# Patient Record
Sex: MALE | Race: OTHER | HISPANIC OR LATINO
[De-identification: names, ages, dates, MRNs, and addresses within clinical notes are randomized per-mention and may not be internally consistent; named-entity substitution may affect disease eponyms.]

---

## 2020-09-04 ENCOUNTER — APPOINTMENT (OUTPATIENT)
Dept: UROLOGY | Facility: CLINIC | Age: 85
End: 2020-09-04
Payer: MEDICARE

## 2020-09-04 ENCOUNTER — LABORATORY RESULT (OUTPATIENT)
Age: 85
End: 2020-09-04

## 2020-09-04 VITALS
BODY MASS INDEX: 25.39 KG/M2 | TEMPERATURE: 98.4 F | HEIGHT: 66 IN | OXYGEN SATURATION: 98 % | SYSTOLIC BLOOD PRESSURE: 137 MMHG | DIASTOLIC BLOOD PRESSURE: 77 MMHG | WEIGHT: 158 LBS | HEART RATE: 77 BPM

## 2020-09-04 DIAGNOSIS — Z86.711 PERSONAL HISTORY OF PULMONARY EMBOLISM: ICD-10-CM

## 2020-09-04 DIAGNOSIS — Z72.89 OTHER PROBLEMS RELATED TO LIFESTYLE: ICD-10-CM

## 2020-09-04 DIAGNOSIS — I27.82 CHRONIC PULMONARY EMBOLISM: ICD-10-CM

## 2020-09-04 DIAGNOSIS — Z86.79 PERSONAL HISTORY OF OTHER DISEASES OF THE CIRCULATORY SYSTEM: ICD-10-CM

## 2020-09-04 DIAGNOSIS — E13.59 OTHER SPECIFIED DIABETES MELLITUS WITH OTHER CIRCULATORY COMPLICATIONS: ICD-10-CM

## 2020-09-04 DIAGNOSIS — Z86.718 PERSONAL HISTORY OF OTHER VENOUS THROMBOSIS AND EMBOLISM: ICD-10-CM

## 2020-09-04 DIAGNOSIS — Z95.5 PRESENCE OF CORONARY ANGIOPLASTY IMPLANT AND GRAFT: ICD-10-CM

## 2020-09-04 PROBLEM — Z00.00 ENCOUNTER FOR PREVENTIVE HEALTH EXAMINATION: Status: ACTIVE | Noted: 2020-09-04

## 2020-09-04 PROCEDURE — 99203 OFFICE O/P NEW LOW 30 MIN: CPT

## 2020-09-04 NOTE — REVIEW OF SYSTEMS
[see HPI] : see HPI [Eyesight Problems] : eyesight problems [Recent Weight Loss (___ Lbs)] : recent [unfilled] ~Ulb weight loss [Initiating Urination Req. Strain] : initiating urination requires straining [Incomplete Emptying] : incomplete emptying of bladder [Negative] : Gastrointestinal

## 2020-09-04 NOTE — ASSESSMENT
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 Levi Orta for lower urinary tract symptoms and an elevated PSA of 11.4.  The patient is not bothered by his urinary symptoms.  He does have significant residual.  Will 1 queried about his symptoms and his willingness to take medication for them he expressed interest.  We discussed Flomax and its side effects is potential impact on his urinary symptoms.  He was instructed to take Flomax 1 tablet 1/2-hour after 1 meal daily.  He was warned regarding hypotension dizziness etc.\par \par We discussed his elevated PSA of 11.4.  He was unaware of this.  We discussed the fact that this may represent prostate carcinoma.  He does not have any physical findings suggestive of this.  We will repeat his PSA.  He will be seen in 2 weeks to review his urinary symptoms and his PSA as well as any further testing that may be warranted.\par \par The LIYA GROSSMAN  expressed fully understanding of the information provided, the consequences and the management.\par

## 2020-09-04 NOTE — LETTER BODY
[FreeTextEntry2] : Quincy Orta MD\par [address] [place] [address]\par [address]\par [place], [place] [address]\par  [FreeTextEntry1] : Dear Quincy,\par \par Thank you for your kind referral.  I am enclosing a copy of my office note for your information.\par \par I will keep you informed of any developments.\par \par Feel free to contact me if you have any questions.\par \par Sincerely,\par \par Amrit Bryant MD, FACS\par Professor of Urology\par [facility]\par \par [address] [facility]\par [place], [place]  [address]\par \par [address]\par [place], [place] [address]\par \par Office Telephone \par [phone]\par [phone]\par

## 2020-09-04 NOTE — PHYSICAL EXAM
[General Appearance - Well Nourished] : well nourished [Exaggerated Use Of Accessory Muscles For Inspiration] : no accessory muscle use [Abdomen Mass (___ Cm)] : no abdominal mass palpated [Costovertebral Angle Tenderness] : no ~M costovertebral angle tenderness [Prostate Enlargement] : the prostate was not enlarged [Urethral Meatus] : meatus normal [Penis Abnormality] : normal uncircumcised penis [Prostate Tenderness] : the prostate was not tender [Skin Color & Pigmentation] : normal skin color and pigmentation [Affect] : the affect was normal [Edema] : no peripheral edema [No Focal Deficits] : no focal deficits [FreeTextEntry1] : blind

## 2020-09-05 LAB
APPEARANCE: ABNORMAL
BILIRUBIN URINE: NEGATIVE
BLOOD URINE: NEGATIVE
COLOR: YELLOW
GLUCOSE QUALITATIVE U: NEGATIVE
KETONES URINE: NORMAL
LEUKOCYTE ESTERASE URINE: ABNORMAL
NITRITE URINE: NEGATIVE
PH URINE: 5.5
PROTEIN URINE: ABNORMAL
PSA SERPL-MCNC: 8.85 NG/ML
SPECIFIC GRAVITY URINE: 1.03
UROBILINOGEN URINE: ABNORMAL

## 2020-09-08 LAB
ALP BONE SERPL-MCNC: 6 MCG/L
BACTERIA UR CULT: NORMAL

## 2020-09-18 ENCOUNTER — APPOINTMENT (OUTPATIENT)
Dept: UROLOGY | Facility: CLINIC | Age: 85
End: 2020-09-18
Payer: MEDICARE

## 2020-09-18 PROCEDURE — 51798 US URINE CAPACITY MEASURE: CPT

## 2020-09-18 PROCEDURE — 99213 OFFICE O/P EST LOW 20 MIN: CPT | Mod: 25

## 2020-09-18 NOTE — ASSESSMENT
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 Levi Orta for lower urinary tract symptoms and an elevated PSA of 11.4.  The patient is not bothered by his urinary symptoms.  He does have significant residual.  Will 1 queried about his symptoms and his willingness to take medication for them he expressed interest.  We discussed Flomax and its side effects is potential impact on his urinary symptoms.  He was instructed to take Flomax 1 tablet 1/2-hour after 1 meal daily.  He was warned regarding hypotension dizziness etc.\par \par We discussed his elevated PSA of 11.4.  He was unaware of this.  We discussed the fact that this may represent prostate carcinoma.  He does not have any physical findings suggestive of this.  We will repeat his PSA.  He will be seen in 2 weeks to review his urinary symptoms and his PSA as well as any further testing that may be warranted.\par \par The LIYA GROSSMAN  expressed fully understanding of the information provided, the consequences and the management.\par \par 9.18.2020\par urinary symptoms improved on flomax\par PVR reduced \par flow nondiagnostic\par \par discussed elevated PSA\par discussed possible biopsy \par discussed risks \par discussed deferring biopsy in light of age \par will monitor PSA\par proceed with CT of pelvis re evidence of disease\par \par

## 2020-09-18 NOTE — LETTER BODY
[FreeTextEntry2] : Quincy Orta MD\par [address] [place] [address]\par [address]\par [place], [place] [address]\par  [FreeTextEntry1] : Dear Quincy\par \par \par I had the opportunity to see your patient, Mr. LIYA GROSSMAN in followup. I am enclosing my office note for your information.\par \par I will keep you informed of any developments.\par \par Feel free to contact me if you have any questions.\par \par Sincerely,\par \par Amrit Bryant MD, FACS\par Professor of Urology\par [facility]\par \par [address] [facility]\par [place], [place]  [address]\par \par [address]\par [place], [place] [address]\par \par Office Telephone \par [phone]\par [phone]\par

## 2020-09-18 NOTE — HISTORY OF PRESENT ILLNESS
[FreeTextEntry1] : 87 year old blind retired  with diabetes accompanied by wife.\par Couple not clear why they were here for visit.\par Endorsed that they had been given materials by Dr Orta but forgot to bring.\par Called Dr Orta and discussed \par Referral for :\par 1. LUTS\par 2. PSA of 11.4\par \par 9.18.2020\par returns on flomax\par returns to discuss PSA

## 2020-09-18 NOTE — PHYSICAL EXAM
[Abdomen Soft] : soft [Abdomen Tenderness] : non-tender [] : no hepato-splenomegaly [Abdomen Mass (___ Cm)] : no abdominal mass palpated

## 2020-10-01 ENCOUNTER — APPOINTMENT (OUTPATIENT)
Dept: CT IMAGING | Facility: CLINIC | Age: 85
End: 2020-10-01

## 2021-01-04 ENCOUNTER — APPOINTMENT (OUTPATIENT)
Dept: UROLOGY | Facility: CLINIC | Age: 86
End: 2021-01-04

## 2021-01-04 ENCOUNTER — APPOINTMENT (OUTPATIENT)
Dept: UROLOGY | Facility: CLINIC | Age: 86
End: 2021-01-04
Payer: MEDICARE

## 2021-01-04 PROCEDURE — 99213 OFFICE O/P EST LOW 20 MIN: CPT

## 2021-01-04 PROCEDURE — 99072 ADDL SUPL MATRL&STAF TM PHE: CPT

## 2021-01-04 NOTE — ASSESSMENT
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 Levi Orta for lower urinary tract symptoms and an elevated PSA of 11.4.  The patient is not bothered by his urinary symptoms.  He does have significant residual.  Will 1 queried about his symptoms and his willingness to take medication for them he expressed interest.  We discussed Flomax and its side effects is potential impact on his urinary symptoms.  He was instructed to take Flomax 1 tablet 1/2-hour after 1 meal daily.  He was warned regarding hypotension dizziness etc.\par \par We discussed his elevated PSA of 11.4.  He was unaware of this.  We discussed the fact that this may represent prostate carcinoma.  He does not have any physical findings suggestive of this.  We will repeat his PSA.  He will be seen in 2 weeks to review his urinary symptoms and his PSA as well as any further testing that may be warranted.\par \par The LIYA GROSSMAN  expressed fully understanding of the information provided, the consequences and the management.\par \par 9.18.2020\par urinary symptoms improved on flomax\par PVR reduced \par flow nondiagnostic\par \par discussed elevated PSA\par discussed possible biopsy \par discussed risks \par discussed deferring biopsy in light of age \par will monitor PSA\par proceed with CT of pelvis re evidence of disease\par \par \par \par 1.4.2021\par returns on flomax\par LUTS improved on flomax\par will repeat PSA\par reviewed prior PSA and concern for prostate cancer\par discussed proceeding with MRI\par received approval\par discussed need for MRI\par discussed fact that CT was not approved\par discussed assessment for prostate cancer\par decision to treat would be based upon MRI and PSA results\par

## 2021-01-04 NOTE — HISTORY OF PRESENT ILLNESS
[FreeTextEntry1] : 87 year old blind retired  with diabetes accompanied by wife.\par Couple not clear why they were here for visit.\par Endorsed that they had been given materials by Dr Orta but forgot to bring.\par Called Dr Orta and discussed \par Referral for :\par 1. LUTS\par 2. PSA of 11.4\par \par 9.18.2020\par returns on flomax\par returns to discuss PSA\par \par 1/4/2021\par  [id number]\par philippe\par patient returns with wife\par wife states she does not need \par patient has not done MRI as ordered

## 2021-01-05 LAB — PSA SERPL-MCNC: 4.98 NG/ML

## 2021-01-08 ENCOUNTER — RESULT REVIEW (OUTPATIENT)
Age: 86
End: 2021-01-08

## 2021-01-08 ENCOUNTER — APPOINTMENT (OUTPATIENT)
Dept: MRI IMAGING | Facility: HOSPITAL | Age: 86
End: 2021-01-08

## 2021-01-08 ENCOUNTER — OUTPATIENT (OUTPATIENT)
Dept: OUTPATIENT SERVICES | Facility: HOSPITAL | Age: 86
LOS: 1 days | End: 2021-01-08
Payer: MEDICARE

## 2021-01-08 PROCEDURE — A9585: CPT

## 2021-01-08 PROCEDURE — 72197 MRI PELVIS W/O & W/DYE: CPT

## 2021-01-08 PROCEDURE — 72197 MRI PELVIS W/O & W/DYE: CPT | Mod: 26

## 2021-07-12 ENCOUNTER — LABORATORY RESULT (OUTPATIENT)
Age: 86
End: 2021-07-12

## 2021-07-12 ENCOUNTER — APPOINTMENT (OUTPATIENT)
Dept: UROLOGY | Facility: CLINIC | Age: 86
End: 2021-07-12
Payer: MEDICARE

## 2021-07-12 PROCEDURE — 99213 OFFICE O/P EST LOW 20 MIN: CPT

## 2021-07-12 PROCEDURE — 51798 US URINE CAPACITY MEASURE: CPT

## 2021-07-12 NOTE — HISTORY OF PRESENT ILLNESS
[FreeTextEntry1] : 87 year old blind retired  with diabetes accompanied by wife.\par Couple not clear why they were here for visit.\par Endorsed that they had been given materials by Dr Orta but forgot to bring.\par Called Dr Orta and discussed \par Referral for :\par 1. LUTS\par 2. PSA of 11.4\par \par 9.18.2020\par returns on flomax\par returns to discuss PSA\par \par 1/4/2021\par  [id number]\par philippe\par patient returns with wife\par wife states she does not need \par patient has not done MRI as ordered\par \par 7.12.2021\par patient returns with wife\par ran out of tamsulosin several weeks ago\par not clear if better on medication but wife thinks so
no

## 2021-07-12 NOTE — ASSESSMENT
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 Levi Orta for lower urinary tract symptoms and an elevated PSA of 11.4.  The patient is not bothered by his urinary symptoms.  He does have significant residual.  Will 1 queried about his symptoms and his willingness to take medication for them he expressed interest.  We discussed Flomax and its side effects is potential impact on his urinary symptoms.  He was instructed to take Flomax 1 tablet 1/2-hour after 1 meal daily.  He was warned regarding hypotension dizziness etc.\par \par We discussed his elevated PSA of 11.4.  He was unaware of this.  We discussed the fact that this may represent prostate carcinoma.  He does not have any physical findings suggestive of this.  We will repeat his PSA.  He will be seen in 2 weeks to review his urinary symptoms and his PSA as well as any further testing that may be warranted.\par \par The LIYA GROSSMAN  expressed fully understanding of the information provided, the consequences and the management.\par \par 9.18.2020\par urinary symptoms improved on flomax\par PVR reduced \par flow nondiagnostic\par \par discussed elevated PSA\par discussed possible biopsy \par discussed risks \par discussed deferring biopsy in light of age \par will monitor PSA\par proceed with CT of pelvis re evidence of disease\par \par \par \par 7.12.2021\par elevated PSA \par MRI no suspicious lesions\par Low PSAD 0.05\par exam unremarlkable\par will repeat PSA\par \par LUTS improved PVR on medication\par continue flomax

## 2021-07-12 NOTE — PHYSICAL EXAM
[Urethral Meatus] : meatus normal [Penis Abnormality] : normal uncircumcised penis [Prostate Enlargement] : the prostate was not enlarged [Prostate Tenderness] : the prostate was not tender [FreeTextEntry1] : right hydrocele; PVR 66

## 2021-07-30 ENCOUNTER — APPOINTMENT (OUTPATIENT)
Dept: UROLOGY | Facility: CLINIC | Age: 86
End: 2021-07-30
Payer: MEDICARE

## 2021-07-30 VITALS — TEMPERATURE: 98.4 F | SYSTOLIC BLOOD PRESSURE: 141 MMHG | DIASTOLIC BLOOD PRESSURE: 78 MMHG

## 2021-07-30 PROCEDURE — 99212 OFFICE O/P EST SF 10 MIN: CPT

## 2021-07-30 NOTE — HISTORY OF PRESENT ILLNESS
[FreeTextEntry1] : 87 year old blind retired  with diabetes accompanied by wife.\par Couple not clear why they were here for visit.\par Endorsed that they had been given materials by Dr Orta but forgot to bring.\par Called Dr Orta and discussed \par Referral for :\par 1. LUTS\par 2. PSA of 11.4\par \par 9.18.2020\par returns on flomax\par returns to discuss PSA\par \par 1/4/2021\par  [id number]\par philippe\par patient returns with wife\par wife states she does not need \par patient has not done MRI as ordered\par \par 7.12.2021\par patient returns with wife\par ran out of tamsulosin several weeks ago\par not clear if better on medication but wife thinks so\par \par 7.30.2021\par patient returns \par he did not restart tamsulosin\par  [Urinary Retention] : no urinary retention [Urinary Urgency] : no urinary urgency [Urinary Frequency] : no urinary frequency [Nocturia] : nocturia [Dysuria] : no dysuria [Hematuria - Gross] : no gross hematuria

## 2021-07-30 NOTE — ASSESSMENT
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 Levi Orta for lower urinary tract symptoms and an elevated PSA of 11.4.  The patient is not bothered by his urinary symptoms.  He does have significant residual.  Will 1 queried about his symptoms and his willingness to take medication for them he expressed interest.  We discussed Flomax and its side effects is potential impact on his urinary symptoms.  He was instructed to take Flomax 1 tablet 1/2-hour after 1 meal daily.  He was warned regarding hypotension dizziness etc.\par \par We discussed his elevated PSA of 11.4.  He was unaware of this.  We discussed the fact that this may represent prostate carcinoma.  He does not have any physical findings suggestive of this.  We will repeat his PSA.  He will be seen in 2 weeks to review his urinary symptoms and his PSA as well as any further testing that may be warranted.\par \par The LIYA GROSSMAN  expressed fully understanding of the information provided, the consequences and the management.\par \par 9.18.2020\par urinary symptoms improved on flomax\par PVR reduced \par flow nondiagnostic\par \par discussed elevated PSA\par discussed possible biopsy \par discussed risks \par discussed deferring biopsy in light of age \par will monitor PSA\par proceed with CT of pelvis re evidence of disease\par \par \par \par 7.12.2021\par elevated PSA \par MRI no suspicious lesions\par Low PSAD 0.05\par \par 7.30.2021\par patient and wife returns\par discussed PSA value and rise at last visit\par Prior PSA higher\par discussed variable and need to repeat in 3 months\par

## 2021-10-14 ENCOUNTER — APPOINTMENT (OUTPATIENT)
Dept: UROLOGY | Facility: CLINIC | Age: 86
End: 2021-10-14

## 2021-10-22 ENCOUNTER — LABORATORY RESULT (OUTPATIENT)
Age: 86
End: 2021-10-22

## 2021-10-22 ENCOUNTER — APPOINTMENT (OUTPATIENT)
Dept: UROLOGY | Facility: CLINIC | Age: 86
End: 2021-10-22
Payer: MEDICARE

## 2021-10-22 VITALS — TEMPERATURE: 98.3 F

## 2021-10-22 DIAGNOSIS — R39.9 UNSPECIFIED SYMPTOMS AND SIGNS INVOLVING THE GENITOURINARY SYSTEM: ICD-10-CM

## 2021-10-22 PROCEDURE — 99213 OFFICE O/P EST LOW 20 MIN: CPT

## 2021-10-22 RX ORDER — TAMSULOSIN HYDROCHLORIDE 0.4 MG/1
0.4 CAPSULE ORAL
Qty: 90 | Refills: 3 | Status: ACTIVE | COMMUNITY
Start: 2020-09-04 | End: 1900-01-01

## 2021-10-22 NOTE — ASSESSMENT
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 Levi Orta for lower urinary tract symptoms and an elevated PSA of 11.4.  The patient is not bothered by his urinary symptoms.  He does have significant residual.  Will 1 queried about his symptoms and his willingness to take medication for them he expressed interest.  We discussed Flomax and its side effects is potential impact on his urinary symptoms.  He was instructed to take Flomax 1 tablet 1/2-hour after 1 meal daily.  He was warned regarding hypotension dizziness etc.\par \par We discussed his elevated PSA of 11.4.  He was unaware of this.  We discussed the fact that this may represent prostate carcinoma.  He does not have any physical findings suggestive of this.  We will repeat his PSA.  He will be seen in 2 weeks to review his urinary symptoms and his PSA as well as any further testing that may be warranted.\par \par The LIYA GROSSMAN  expressed fully understanding of the information provided, the consequences and the management.\par \par 9.18.2020\par urinary symptoms improved on flomax\par PVR reduced \par flow nondiagnostic\par \par discussed elevated PSA\par discussed possible biopsy \par discussed risks \par discussed deferring biopsy in light of age \par will monitor PSA\par proceed with CT of pelvis re evidence of disease\par \par \par \par 7.12.2021\par elevated PSA \par MRI no suspicious lesions\par Low PSAD 0.05\par \par 10.22.2021\par recently seen by Dr Sin\par LUTS continue flomax\par needs repeat PSA\par wiife expressed under sanding\par

## 2022-01-10 ENCOUNTER — APPOINTMENT (OUTPATIENT)
Dept: UROLOGY | Facility: CLINIC | Age: 87
End: 2022-01-10

## 2022-03-14 ENCOUNTER — APPOINTMENT (OUTPATIENT)
Dept: UROLOGY | Facility: CLINIC | Age: 87
End: 2022-03-14

## 2022-03-21 ENCOUNTER — APPOINTMENT (OUTPATIENT)
Dept: UROLOGY | Facility: CLINIC | Age: 87
End: 2022-03-21
Payer: MEDICARE

## 2022-03-21 ENCOUNTER — LABORATORY RESULT (OUTPATIENT)
Age: 87
End: 2022-03-21

## 2022-03-21 VITALS — SYSTOLIC BLOOD PRESSURE: 96 MMHG | DIASTOLIC BLOOD PRESSURE: 83 MMHG | TEMPERATURE: 96.6 F | HEART RATE: 71 BPM

## 2022-03-21 DIAGNOSIS — R97.20 ELEVATED PROSTATE, SPECIFIC ANTIGEN [PSA]: ICD-10-CM

## 2022-03-21 DIAGNOSIS — R33.9 RETENTION OF URINE, UNSPECIFIED: ICD-10-CM

## 2022-03-21 PROCEDURE — 99213 OFFICE O/P EST LOW 20 MIN: CPT

## 2022-03-21 NOTE — PHYSICAL EXAM
[Urethral Meatus] : meatus normal [Penis Abnormality] : normal uncircumcised penis [Prostate Enlargement] : the prostate was not enlarged [Prostate Tenderness] : the prostate was not tender [Abdomen Soft] : soft [Abdomen Tenderness] : non-tender [] : no hepato-splenomegaly [Abdomen Mass (___ Cm)] : no abdominal mass palpated [FreeTextEntry1] :  ( did no void)

## 2022-03-21 NOTE — LETTER BODY
[FreeTextEntry2] : Quincy Orta MD\par [address] [place] [address]\par [address]\par [place], [place] [address]\par  [FreeTextEntry1] : Dear Quincy,\par  \par \par \par \par I had the opportunity to see your patient, Mr. LIYA GROSSMAN in followup. I am enclosing my office note for your information.\par \par I will keep you informed of any developments.\par \par Feel free to contact me if you have any questions.\par \par Sincerely,\par \par Amrit Bryant MD, FACS\par Professor of Urology\par [facility]\par \par [address]\par [place], [place] [address]\par \par [address]\par [place], [place] [address]\par \par Office Telephone \par [phone]\par \par Fax\par [phone]\par

## 2022-03-21 NOTE — HISTORY OF PRESENT ILLNESS
[FreeTextEntry1] : 87 year old blind retired  with diabetes accompanied by wife.\par Couple not clear why they were here for visit.\par Endorsed that they had been given materials by Dr Orta but forgot to bring.\par Called Dr Orta and discussed \par Referral for :\par 1. LUTS\par 2. PSA of 11.4\par \par 9.18.2020\par returns on flomax\par returns to discuss PSA\par \par 1/4/2021\par  [id number]\par philippe\par patient returns with wife\par wife states she does not need \par patient has not done MRI as ordered\par \par 7.12.2021\par patient returns with wife\par ran out of tamsulosin several weeks ago\par not clear if better on medication but wife thinks so\par \par 7.30.2021\par patient returns \par he did not restart tamsulosin\par \par \par 10.22.2021\par returns with wife\par using urinal at home \par able to walk to bathroom\par no incontinence\par improved on tamsulosin\par \par 3.21.2022\par hospitalized in [place]\par elevated BS \par no covid

## 2022-03-21 NOTE — ASSESSMENT
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 Levi Orta for lower urinary tract symptoms and an elevated PSA of 11.4.  The patient is not bothered by his urinary symptoms.  He does have significant residual.  Will 1 queried about his symptoms and his willingness to take medication for them he expressed interest.  We discussed Flomax and its side effects is potential impact on his urinary symptoms.  He was instructed to take Flomax 1 tablet 1/2-hour after 1 meal daily.  He was warned regarding hypotension dizziness etc.\par \par We discussed his elevated PSA of 11.4.  He was unaware of this.  We discussed the fact that this may represent prostate carcinoma.  He does not have any physical findings suggestive of this.  We will repeat his PSA.  He will be seen in 2 weeks to review his urinary symptoms and his PSA as well as any further testing that may be warranted.\par \par The LIYA GROSSMAN  expressed fully understanding of the information provided, the consequences and the management.\par \par 9.18.2020\par urinary symptoms improved on flomax\par PVR reduced \par flow nondiagnostic\par \par discussed elevated PSA\par discussed possible biopsy \par discussed risks \par discussed deferring biopsy in light of age \par will monitor PSA\par proceed with CT of pelvis re evidence of disease\par \par \par \par 7.12.2021\par elevated PSA \par MRI no suspicious lesions\par Low PSAD 0.05\par \par \par 3.21.2022\par hospitalized in [place] for elevated BS and ? infection in his lung\par Covid negative\par vacinated\par continues to be aware of need to urinate \par wears diaper in bed; difficulty getting up but aware of need to urinates\par voids q 2-3 hours\par continue tamsulosin\par Plan:\par 1. PSA\par 2. glucose/Hbg A1c\par 3. urine culture\par 4. fu in 6 momths\par \par

## 2022-09-15 ENCOUNTER — NON-APPOINTMENT (OUTPATIENT)
Age: 87
End: 2022-09-15

## 2022-09-19 ENCOUNTER — APPOINTMENT (OUTPATIENT)
Dept: UROLOGY | Facility: CLINIC | Age: 87
End: 2022-09-19
